# Patient Record
Sex: MALE | Employment: FULL TIME | URBAN - METROPOLITAN AREA
[De-identification: names, ages, dates, MRNs, and addresses within clinical notes are randomized per-mention and may not be internally consistent; named-entity substitution may affect disease eponyms.]

---

## 2021-01-25 ENCOUNTER — NURSE TRIAGE (OUTPATIENT)
Dept: OTHER | Facility: OTHER | Age: 71
End: 2021-01-25

## 2021-01-25 ENCOUNTER — TELEPHONE (OUTPATIENT)
Dept: OTHER | Facility: OTHER | Age: 71
End: 2021-01-25

## 2021-01-25 DIAGNOSIS — Z20.828 SARS-ASSOCIATED CORONAVIRUS EXPOSURE: Primary | ICD-10-CM

## 2021-01-25 DIAGNOSIS — Z20.828 SARS-ASSOCIATED CORONAVIRUS EXPOSURE: ICD-10-CM

## 2021-01-25 LAB
FLUAV RNA RESP QL NAA+PROBE: NEGATIVE
FLUBV RNA RESP QL NAA+PROBE: NEGATIVE
RSV RNA RESP QL NAA+PROBE: NEGATIVE
SARS-COV-2 RNA RESP QL NAA+PROBE: POSITIVE

## 2021-01-25 PROCEDURE — 0241U HB NFCT DS VIR RESP RNA 4 TRGT: CPT

## 2021-01-25 NOTE — TELEPHONE ENCOUNTER
Reason for Disposition   [1] COVID-19 infection suspected by caller or triager AND [2] mild symptoms (cough, fever, or others) AND [3] no complications or SOB    Answer Assessment - Initial Assessment Questions  1  COVID-19 DIAGNOSIS: "Who made your Coronavirus (COVID-19) diagnosis?" "Was it confirmed by a positive lab test?" If not diagnosed by a HCP, ask "Are there lots of cases (community spread) where you live?" (See public health department website, if unsure)      No diagnosed  2  COVID-19 EXPOSURE: "Was there any known exposure to COVID before the symptoms began?" [facility] Definition of close contact: within 6 feet (2 meters) for a total of 15 minutes or more over a 24-hour period  denies  3  ONSET: "When did the COVID-19 symptoms start?"       1/22/21  4  WORST SYMPTOM: "What is your worst symptom?" (e g , cough, fever, shortness of breath, muscle aches)      Muscle aches  5  COUGH: "Do you have a cough?" If so, ask: "How bad is the cough?"        Dry occasional cough  6  FEVER: "Do you have a fever?" If so, ask: "What is your temperature, how was it measured, and when did it start?"      denies  7  RESPIRATORY STATUS: "Describe your breathing?" (e g , shortness of breath, wheezing, unable to speak)       denies  8  BETTER-SAME-WORSE: "Are you getting better, staying the same or getting worse compared to yesterday?"  If getting worse, ask, "In what way?"      better  9  HIGH RISK DISEASE: "Do you have any chronic medical problems?" (e g , asthma, heart or lung disease, weak immune system, etc )      denies  10  PREGNANCY: "Is there any chance you are pregnant?" "When was your last menstrual period?"        n/a  11   OTHER SYMPTOMS: "Do you have any other symptoms?"  (e g , chills, fatigue, headache, loss of smell or taste, muscle pain, sore throat)        Diarrhea and body aches    Protocols used: CORONAVIRUS (COVID-19) DIAGNOSED OR SUSPECTED-ADULT-[place]

## 2021-01-25 NOTE — TELEPHONE ENCOUNTER
Regarding: covid symp back pain diarrhea  ----- Message from Arley De La Fuente sent at 1/25/2021  9:58 AM EST -----  Covid Symp  "primally back pain, over all ached, diarrhea maybe a little fatigue over the weekend"

## 2021-01-25 NOTE — TELEPHONE ENCOUNTER
Your test for the novel coronavirus, also known as COVID-19, was positive  The sample showed that the virus was present  Positive COVID-19 test results are reportable to the [place] Department of Health  You may receive a call from trained public health staff to conduct an interview  It is important to answer their call  They will ask you to verify who you are  During the call they will ask you about what symptoms you have, what you did before you got sick, and who you were close to while sick  The health department does this to make sure everyone stays healthy and to reduce the spread of the virus  If you would like to verify if the caller does in fact work in contact tracing, call the [address] [place] [address] at [employer] ([phone])  For additional information, please visit the [employer] website: www [email or web address] [email or web address]     If you have any additional questions, we can schedule a virtual visit for you with a provider or call the [facility] hotline [phone], option 7, for care advice    For additional information, please visit the Coronavirus FAQ on the [facility] home page ([email or web address])  Pt has an out of network PCP for follow up